# Patient Record
Sex: FEMALE | Race: BLACK OR AFRICAN AMERICAN | Employment: FULL TIME | URBAN - METROPOLITAN AREA
[De-identification: names, ages, dates, MRNs, and addresses within clinical notes are randomized per-mention and may not be internally consistent; named-entity substitution may affect disease eponyms.]

---

## 2018-03-23 ENCOUNTER — HOSPITAL ENCOUNTER (OUTPATIENT)
Dept: MAMMOGRAPHY | Age: 43
Discharge: HOME | End: 2018-03-23
Attending: INTERNAL MEDICINE
Payer: COMMERCIAL

## 2018-03-23 ENCOUNTER — HOSPITAL ENCOUNTER (OUTPATIENT)
Dept: ULTRASOUND IMAGING | Age: 43
Discharge: HOME | End: 2018-03-23
Attending: INTERNAL MEDICINE
Payer: COMMERCIAL

## 2018-03-23 DIAGNOSIS — Z80.3 FAMILY HISTORY OF BREAST CANCER: ICD-10-CM

## 2018-03-23 DIAGNOSIS — N63.0 LUMP OR MASS IN BREAST: ICD-10-CM

## 2018-03-23 PROCEDURE — 77062 BREAST TOMOSYNTHESIS BI: CPT

## 2018-03-23 PROCEDURE — 76642 ULTRASOUND BREAST LIMITED: CPT

## 2018-04-17 ENCOUNTER — OFFICE VISIT (OUTPATIENT)
Dept: SURGERY | Age: 43
End: 2018-04-17

## 2018-04-17 VITALS
OXYGEN SATURATION: 98 % | HEART RATE: 96 BPM | SYSTOLIC BLOOD PRESSURE: 128 MMHG | DIASTOLIC BLOOD PRESSURE: 80 MMHG | WEIGHT: 133.2 LBS | RESPIRATION RATE: 16 BRPM | BODY MASS INDEX: 21.41 KG/M2 | TEMPERATURE: 98.9 F | HEIGHT: 66 IN

## 2018-04-17 DIAGNOSIS — N63.10 BREAST MASS, RIGHT: Primary | ICD-10-CM

## 2018-04-17 NOTE — PROGRESS NOTES
Patient presents today for evaluation of right breast mass, First noticed at the beginning of February. No Discharge or pain.

## 2018-04-17 NOTE — MR AVS SNAPSHOT
[address] [facility] 
 
 
 [address] [facility] [address] [address] 
[phone] Patient: Isabella Ricardo MRN: [id number] Visit Information Date & Time Provider Department Dept. Phone Encounter #  
 4/17/2018  3:00 PM David De La Rosa MD [facility] [address] [facility] [id number] Your Appointments 5/17/2018  9:00 AM  
POST OP with David De La Rosa MD  
[address] [facility] Appt Note: Post op [address] [facility] [address] [place]  
  
   
 [address] [facility] [address] [facility] [address] Upcoming Health Maintenance Date Due DTaP/Tdap/Td series (1 - Tdap) 3/3/1994 PAP AKA CERVICAL CYTOLOGY 3/3/1994 Influenza Age 5 to Adult 8/1/2017 Allergies as of 4/17/2018  Review Complete On: 4/17/2018 By: David De La Rosa MD  
  
 Severity Noted Reaction Type Reactions Sulfa (Sulfonamide Antibiotics)  12/30/2014    Hives Current Immunizations  Never Reviewed No immunizations on file. Not reviewed this visit You Were Diagnosed With   
  
 Codes Comments Breast mass, right    -  Primary ICD-10-CM: N63.10 ICD-9-CM: 611.72 Vitals BP Pulse Temp Resp Height(growth percentile) Weight(growth percentile) 128/80 (BP 1 Location: Right arm, BP Patient Position: Sitting) 96 98.9 °F (37.2 °C) (Oral) 16 5' 6\" (1.676 m) 133 lb 3.2 oz (60.4 kg) SpO2 BMI OB Status Smoking Status 98% 21.5 kg/m2 Having regular periods Never Smoker Vitals History BMI and BSA Data Body Mass Index Body Surface Area  
 21.5 kg/m 2 1.68 m 2 Your Updated Medication List  
  
   
This list is accurate as of 4/17/18  4:16 PM.  Always use your most recent med list.  
  
  
  
  
 Carlos Eduarod Valverde (26) 0.1-20 mg-mcg Tab Generic drug:  levonorgestrel-ethinyl estradiol Take  by mouth. hydroCHLOROthiazide 12.5 mg tablet Commonly known as:  HYDRODIURIL Take 12.5 mg by mouth daily. We Performed the Following CBC WITH AUTOMATED DIFF [34031 CPT(R)] METABOLIC PANEL, COMPREHENSIVE [60013 CPT(R)] To-Do List   
 09/24/2018 9:00 AM  
  Appointment with [address] [place] [address] at [facility] ([phone]) OUTSIDE FILMS  - Any outside films related to the study being scheduled should be brought with you on the day of the exam.  If this cannot be done there may be a delay in the reading of the study. MEDICATIONS  - Patient must bring a complete list of all medications currently taking to include prescriptions, over-the-counter meds, herbals, vitamins & any dietary supplements Patient Instructions If you have any questions or concerns about today's appointment, the verbal and/or written instructions you were given for follow up care, please call our office at [phone]. [facility] - [address], [place] [phone] office [phone] fax Jose L Martínez PATIENT PRE AND POST OPERATIVE INSTRUCTIONS [address] 
[phone] Before Surgery Instructions:  
1) You must have someone available to drive you to and from your procedure and stay with you for the first 24 hours. 2) It is very important that you have nothing to eat or drink after midnight the night before your surgery. This includes chewing gum or sucking on hard candy. Take only heart, blood pressure and cholesterol medications the morning of surgery with only a sip of water. 3) Please stop taking Plavix 10 days prior to your surgery. Stop taking Coumadin 5 days prior to your surgery. Stop taking all Aspirin or Aspirin containing products 7 days prior to your surgery. Stop taking Advil, Motrin, Aleve, and etc. 3 days prior to your surgery. 4) If you take any diabetic medications please consult with your primary care physician on how to take them on the day of your surgery 5) Please stop all Herbal products 2 weeks prior to your surgery. 6) Please arrive at the hospital 2 hours prior to your surgery, unless you have been otherwise instructed. 7) Patients having an operation on their colon will be given a separate instruction sheet on their Bowel Prep. 8) For any pre-operative work up check in at the main entrance to [facility], and then go to Patient Registration. These studies are done on a walk in basis they are open from 7:00am to 5:00pm Monday through Friday. 9) Please wash your surgical site the morning of your surgery with soap and water. 10) If you are of child bearing age you will have pregnancy test done the morning of your surgery as soon as you arrive. 11) You may be contacted to change your surgery time. At times this is necessary due to equipment or staffing needs. After Surgery Instructions: You will need to be seen in the office for a follow-up visit 7-14 days after your surgery. Please call after you have had the procedure to make this appointment. Unless otherwise instructed, you may remove your outer bandage and shower 48 hours after your surgery. If you develop a fever greater than 101, have any significant drainage, bleeding, swelling and/or pus of the wound. Please call our office immediately. Surgery Date and Time:  Thursday, May 10, 2018 at 1:00pm 
 
Please check in at [facility], enter through the Emergency Room entrance and go up to the second floor. Please check in by 11:00am the day of your surgery. You may contact Sabrina Hair with any questions at [phone]. Introducing \[facility] [place] [facility]\"" & HEALTH SERVICES! [facility] [employer] introduces [employer] patient portal. Now you can access parts of your medical record, email your doctor's office, and request medication refills online. 1. In your internet browser, go to [email or web address] 2. Click on the First Time User? Click Here link in the Sign In box. You will see the New Member Sign Up page. 3. Enter your [employer] Access Code exactly as it appears below. You will not need to use this code after youve completed the sign-up process. If you do not sign up before the expiration date, you must request a new code. · [employer] Access Code: [email or web address] Expires: 6/14/2018  2:34 PM 
 
4. Enter the last four digits of your Social Security Number (xxxx) and Date of Birth (mm/dd/yyyy) as indicated and click Submit. You will be taken to the next sign-up page. 5. Create a Solexat ID. This will be your [employer] login ID and cannot be changed, so think of one that is secure and easy to remember. 6. Create a [employer] password. You can change your password at any time. 7. Enter your Password Reset Question and Answer. This can be used at a later time if you forget your password. 8. Enter your e-mail address. You will receive e-mail notification when new information is available in [address] 9. Click Sign Up. You can now view and download portions of your medical record. 10. Click the Download Summary menu link to download a portable copy of your medical information. If you have questions, please visit the Frequently Asked Questions section of the [employer] website. Remember, [employer] is NOT to be used for urgent needs. For medical emergencies, dial 911. Now available from your iPhone and Android! Please provide this summary of care documentation to your next provider. Your primary care clinician is listed as Jud Quezada. If you have any questions after today's visit, please call [phone].

## 2018-04-17 NOTE — PATIENT INSTRUCTIONS
If you have any questions or concerns about today's appointment, the verbal and/or written instructions you were given for follow up care, please call our office at [phone]. [facility] - [facility]  [address] [place] [address] Road    [phone] office  [phone] fax      . Henok Orr PATIENT PRE AND POST OPERATIVE INSTRUCTIONS     [facility] [address]  [phone]    Before Surgery Instructions:   1) You must have someone available to drive you to and from your procedure and stay with you for the first 24 hours. 2) It is very important that you have nothing to eat or drink after midnight the night before your surgery. This includes chewing gum or sucking on hard candy. Take only heart, blood pressure and cholesterol medications the morning of surgery with only a sip of water. 3) Please stop taking Plavix 10 days prior to your surgery. Stop taking Coumadin 5 days prior to your surgery. Stop taking all Aspirin or Aspirin containing products 7 days prior to your surgery. Stop taking Advil, Motrin, Aleve, and etc. 3 days prior to your surgery. 4) If you take any diabetic medications please consult with your primary care physician on how to take them on the day of your surgery  5) Please stop all Herbal products 2 weeks prior to your surgery. 6) Please arrive at the hospital 2 hours prior to your surgery, unless you have been otherwise instructed. 7) Patients having an operation on their colon will be given a separate instruction sheet on their Bowel Prep. 8) For any pre-operative work up check in at the main entrance to [facility], and then go to Patient Registration. These studies are done on a walk in basis they are open from 7:00am to 5:00pm Monday through Friday. 9) Please wash your surgical site the morning of your surgery with soap and water.   10) If you are of child bearing age you will have pregnancy test done the morning of your surgery as soon as you arrive. 11) You may be contacted to change your surgery time. At times this is necessary due to equipment or staffing needs. After Surgery Instructions: You will need to be seen in the office for a follow-up visit 7-14 days after your surgery. Please call after you have had the procedure to make this appointment. Unless otherwise instructed, you may remove your outer bandage and shower 48 hours after your surgery. If you develop a fever greater than 101, have any significant drainage, bleeding, swelling and/or pus of the wound. Please call our office immediately. Surgery Date and Time:  Thursday, May 10, 2018 at 1:00pm    Please check in at [facility], enter through the Emergency Room entrance and go up to the second floor. Please check in by 11:00am the day of your surgery. You may contact Marcos Christian with any questions at [phone].

## 2018-04-18 NOTE — PROGRESS NOTES
History of present illness    Arin Bashir's to the office for evaluation of a new right breast mass. The patient first felt this in the medial lower right breast about 6 weeks ago. Shortly thereafter she saw her primary care physician and underwent an ultrasound and mammogram.  This showed a near isoechoic small nodule measuring almost 2.1 cm in the right breast in the 5 o'clock position 7 cm from the nipple. The breasts were noted to be quite dense. A small solid nodule appeared benign and perhaps consistent with a fibroadenoma. The patient comes today for further evaluation and consideration of removal.    The patient has had a cyst aspirated from the breast in the past but no other history of any significant breast problems. Menarche occurred at the age of 13 she is  1 she has taken birth control pills for 27 years. Her grandmother was diagnosed with breast cancer in her early 76s. There is no family history of ovarian cancer. Allergies   Allergen Reactions    Sulfa (Sulfonamide Antibiotics) Hives     Past Medical History:   Diagnosis Date    Breast cyst Prior to     Benign cyst - aspirated    Breast lump 2018    LIQ of RT breast palpable    Hypertension      Past Surgical History:   Procedure Laterality Date    HX BREAST BIOPSY Right prior to     benign cyst.  Dr. Rosalee Au office.      HX CYST INCISION AND DRAINAGE Right prior to     apsiration of benign cyst    HX HERNIA REPAIR      HX WISDOM TEETH EXTRACTION       Social History     Social History    Marital status: SINGLE     Spouse name: N/A    Number of children: N/A    Years of education: N/A     Social History Main Topics    Smoking status: Never Smoker    Smokeless tobacco: Never Used    Alcohol use Yes      Comment: occasionally    Drug use: No    Sexual activity: Not Asked     Other Topics Concern    None     Social History Narrative     REVIEW OF SYSTEMS     Constitutional: No fever, weight loss, fatigue or recent chills. Skin:  No recent rashes, dermatitis or abnormal moles. HEENT:  No changes in vision, vertigo, epistaxis, dysphasia, or hoarseness. Cardiac:  No chest pain, palpitations, or edema. History of hypertension with recent change of medications to Norvasc rather than HCTZ     Respiratory: No chronic cough, shortness of breath, wheezing, hemoptysis, or history of sleep apnea. Breasts/GYN:  No history of recent breast lumps or nipple discharge. Mammograms are up to date. No GYN-type problems. See the history of present illness     Gastrointestinal:  No significant food intolerances, no recent vomiting, no chronic abdominal pain, no change in bowel habits, no melena. No history of GERD. Genitourinary:  No history of hematuria, dysuria, frequency, or stress urinary incontinence. No nocturia. Musculoskeletal: No weakness, joint pains, or arthritis. Endocrine:  No history of thyroid disease. No diabetes. Lymph/hemo:  No history of blood transfusions or easy bruising. No anemia. Neuro: No dizziness or headaches or fainting. PHYSICAL EXAM    Visit Vitals    /80 (BP 1 Location: Right arm, BP Patient Position: Sitting)    Pulse 96    Temp 98.9 °F (37.2 °C) (Oral)    Resp 16    Ht 5' 6\" (1.676 m)    Wt 60.4 kg (133 lb 3.2 oz)    SpO2 98%    BMI 21.5 kg/m2          Constitutional:  Well-developed, well-nourished, no acute distress. Head:  Head, eyes, ears, nose, throat within normal limits. Skin:  No suspicious moles or rashes. Neck:  No masses or adenopathy. The airway appears normal. Thyroid is not enlarged and there are no palpable thyroid nodules. Lungs:  Lungs are clear to auscultation and percussion. No respiratory distress. No chest wall tenderness. Heart:  Heart is regular with no extra heart sounds or murmur heard.      Breast Exam: The breasts are free of any discrete tenderness or masses except in the lower inner right breast.  At the 5 o'clock position 7 cm from the nipple she has a palpable nodular nontender very movable mass that measures about 2 cm in size. Clinically this would be consistent with a fibroadenoma the way it moves the skin and nipple areas appear normal. Both axillae are negative. Abdomen: The abdomen is soft and nontender without organomegaly or masses. Bowel sounds are active and of normal pitch. There is no abdominal distention. No hernias are evident. Extremities:  No tenderness of the extremities and no significant swelling. Psych:  Alert and oriented. Assessment: Small nodular solid mass inferomedial right breast possible fibroadenoma #2 family history in patient's grandmother of breast cancer #3 hypertension. Recommendations I discussed with the patient her options for treatment including observation versus ultrasound-guided needle biopsy versus removal of the mass. She agreed to allow us to look at the mass with the ultrasound to see if we can see it and biopsy it. Limited ultrasound visualization of the mass in about the 5 o'clock position 7 cm from the nipple in the right breast revealed a solid fairly smooth margined breast mass that was nearly isoechoic from the surrounding tissue difficult to make out on ultrasound. I then further discussed the options for treatment with the patient including the biopsy. But she has decided that she really wants the mass removed regardless of whether the biopsy turned out benign. Therefore we will proceed with doing an excisional biopsy in the operating room. The above was dictated with Margarita. There may be unrecognized errors.     Blas Gallego MD

## 2018-04-18 NOTE — COMMUNICATION BODY
Dear [place] Dominik came to the office today for evaluation of the movable subcutaneous inferomedial right breast mass. Thank you for allowing me to see her. She just recently discovered this. It has not been tender. It has not changed in size of the last 6 weeks. It was seen on mammogram and ultrasound and appears to be solid. On my examination is located at the 5 o'clock position of the right breast 7 cm from the nipple. It can be seen on ultrasound but is nearly isoechoic with the surrounding tissue. Clinically I believe this probably is a fibroadenoma. However rather than doing a biopsy I decided to proceed with excision of the mass because the patient states that even if the biopsy comes back benign she wants the lump out. We will schedule this as an outpatient.     Thank you for your kind referral,    With kindest regards,    Ravi Wisdom MD

## 2018-05-04 ENCOUNTER — HOSPITAL ENCOUNTER (OUTPATIENT)
Dept: LAB | Age: 43
Discharge: HOME | End: 2018-05-04

## 2018-05-04 LAB — SENTARA SPECIMEN COL,SENBCF: NORMAL

## 2018-05-04 PROCEDURE — 99001 SPECIMEN HANDLING PT-LAB: CPT | Performed by: SPECIALIST

## 2018-05-05 LAB
A-G RATIO,AGRAT: 2.1 RATIO (ref 1.1–2.6)
ALBUMIN SERPL-MCNC: 4.7 G/DL (ref 3.5–5)
ALP SERPL-CCNC: 55 U/L (ref 25–115)
ALT SERPL-CCNC: 9 U/L (ref 5–40)
ANION GAP SERPL CALC-SCNC: 16 MMOL/L
AST SERPL W P-5'-P-CCNC: 18 U/L (ref 10–37)
BILIRUB SERPL-MCNC: 0.4 MG/DL (ref 0.2–1.2)
BUN SERPL-MCNC: 9 MG/DL (ref 6–22)
CALCIUM SERPL-MCNC: 9.7 MG/DL (ref 8.4–10.5)
CHLORIDE SERPL-SCNC: 98 MMOL/L (ref 98–110)
CO2 SERPL-SCNC: 28 MMOL/L (ref 20–32)
CREAT SERPL-MCNC: 1.1 MG/DL (ref 0.5–1.2)
ERYTHROCYTE [DISTWIDTH] IN BLOOD BY AUTOMATED COUNT: 12.9 % (ref 10–15.5)
GFRAA, 66117: >60
GFRNA, 66118: 51.5
GLOBULIN,GLOB: 2.2 G/DL (ref 2–4)
GLUCOSE SERPL-MCNC: 86 MG/DL (ref 70–99)
HCT VFR BLD AUTO: 43.4 % (ref 35.1–48)
HGB BLD-MCNC: 14.5 G/DL (ref 11.7–16)
MCH RBC QN AUTO: 31 PG (ref 26–34)
MCHC RBC AUTO-ENTMCNC: 33 G/DL (ref 31–36)
MCV RBC AUTO: 93 FL (ref 80–95)
PLATELET # BLD AUTO: 295 K/UL (ref 140–440)
PMV BLD AUTO: 11.1 FL (ref 9–13)
POTASSIUM SERPL-SCNC: 3.9 MMOL/L (ref 3.5–5.5)
PROT SERPL-MCNC: 6.9 G/DL (ref 6.4–8.3)
RBC # BLD AUTO: 4.69 M/UL (ref 3.8–5.2)
SODIUM SERPL-SCNC: 142 MMOL/L (ref 133–145)
WBC # BLD AUTO: 3.4 K/UL (ref 4–11)

## 2018-05-09 ENCOUNTER — ANESTHESIA EVENT (OUTPATIENT)
Dept: SURGERY | Age: 43
End: 2018-05-09
Payer: COMMERCIAL

## 2018-05-10 ENCOUNTER — HOSPITAL ENCOUNTER (OUTPATIENT)
Age: 43
Setting detail: OUTPATIENT SURGERY
Discharge: HOME | End: 2018-05-10
Attending: SPECIALIST | Admitting: SPECIALIST
Payer: COMMERCIAL

## 2018-05-10 ENCOUNTER — ANESTHESIA (OUTPATIENT)
Dept: SURGERY | Age: 43
End: 2018-05-10
Payer: COMMERCIAL

## 2018-05-10 VITALS
SYSTOLIC BLOOD PRESSURE: 156 MMHG | BODY MASS INDEX: 21.63 KG/M2 | DIASTOLIC BLOOD PRESSURE: 94 MMHG | HEIGHT: 66 IN | WEIGHT: 134.56 LBS | OXYGEN SATURATION: 100 % | RESPIRATION RATE: 18 BRPM | HEART RATE: 79 BPM | TEMPERATURE: 99.1 F

## 2018-05-10 DIAGNOSIS — N63.10 BREAST MASS, RIGHT: Primary | ICD-10-CM

## 2018-05-10 LAB — HCG UR QL: NEGATIVE

## 2018-05-10 PROCEDURE — 76060000032 HC ANESTHESIA 0.5 TO 1 HR: Performed by: SPECIALIST

## 2018-05-10 PROCEDURE — 77030010938 HC CLP LIG TELE -A: Performed by: SPECIALIST

## 2018-05-10 PROCEDURE — 77030031139 HC SUT VCRL2 J&J -A: Performed by: SPECIALIST

## 2018-05-10 PROCEDURE — 77030002986 HC SUT PROL J&J -A: Performed by: SPECIALIST

## 2018-05-10 PROCEDURE — 74011000250 HC RX REV CODE- 250: Performed by: SPECIALIST

## 2018-05-10 PROCEDURE — 74011250636 HC RX REV CODE- 250/636: Performed by: NURSE ANESTHETIST, CERTIFIED REGISTERED

## 2018-05-10 PROCEDURE — 77030002916 HC SUT ETHLN J&J -A: Performed by: SPECIALIST

## 2018-05-10 PROCEDURE — 77030003028 HC SUT VCRL J&J -A: Performed by: SPECIALIST

## 2018-05-10 PROCEDURE — 88305 TISSUE EXAM BY PATHOLOGIST: CPT | Performed by: SPECIALIST

## 2018-05-10 PROCEDURE — 81025 URINE PREGNANCY TEST: CPT

## 2018-05-10 PROCEDURE — 77030010516 HC APPL HEMA CLP TELE -B: Performed by: SPECIALIST

## 2018-05-10 PROCEDURE — 77030008462 HC STPLR SKN PROX J&J -A: Performed by: SPECIALIST

## 2018-05-10 PROCEDURE — 74011250636 HC RX REV CODE- 250/636

## 2018-05-10 PROCEDURE — 76010000138 HC OR TIME 0.5 TO 1 HR: Performed by: SPECIALIST

## 2018-05-10 PROCEDURE — 76210000020 HC REC RM PH II FIRST 0.5 HR: Performed by: SPECIALIST

## 2018-05-10 PROCEDURE — 77030018836 HC SOL IRR NACL ICUM -A: Performed by: SPECIALIST

## 2018-05-10 PROCEDURE — 76210000016 HC OR PH I REC 1 TO 1.5 HR: Performed by: SPECIALIST

## 2018-05-10 PROCEDURE — 77030012510 HC MSK AIRWY LMA TELE -B: Performed by: ANESTHESIOLOGY

## 2018-05-10 PROCEDURE — 77030032490 HC SLV COMPR SCD KNE COVD -B: Performed by: SPECIALIST

## 2018-05-10 PROCEDURE — 74011000250 HC RX REV CODE- 250

## 2018-05-10 PROCEDURE — 74011250637 HC RX REV CODE- 250/637: Performed by: NURSE ANESTHETIST, CERTIFIED REGISTERED

## 2018-05-10 PROCEDURE — 77030011267 HC ELECTRD BLD COVD -A: Performed by: SPECIALIST

## 2018-05-10 RX ORDER — BUPIVACAINE HYDROCHLORIDE AND EPINEPHRINE 2.5; 5 MG/ML; UG/ML
INJECTION, SOLUTION EPIDURAL; INFILTRATION; INTRACAUDAL; PERINEURAL AS NEEDED
Status: DISCONTINUED | OUTPATIENT
Start: 2018-05-10 | End: 2018-05-10 | Stop reason: HOSPADM

## 2018-05-10 RX ORDER — ACETAMINOPHEN AND CODEINE PHOSPHATE 300; 30 MG/1; MG/1
1-2 TABLET ORAL
Qty: 15 TAB | Refills: 0 | Status: SHIPPED | OUTPATIENT
Start: 2018-05-10 | End: 2018-05-15

## 2018-05-10 RX ORDER — ONDANSETRON 2 MG/ML
INJECTION INTRAMUSCULAR; INTRAVENOUS AS NEEDED
Status: DISCONTINUED | OUTPATIENT
Start: 2018-05-10 | End: 2018-05-10 | Stop reason: HOSPADM

## 2018-05-10 RX ORDER — MORPHINE SULFATE 4 MG/ML
4 INJECTION, SOLUTION INTRAMUSCULAR; INTRAVENOUS
Status: DISCONTINUED | OUTPATIENT
Start: 2018-05-10 | End: 2018-05-10 | Stop reason: HOSPADM

## 2018-05-10 RX ORDER — INSULIN LISPRO 100 [IU]/ML
INJECTION, SOLUTION INTRAVENOUS; SUBCUTANEOUS ONCE
Status: DISCONTINUED | OUTPATIENT
Start: 2018-05-11 | End: 2018-05-10 | Stop reason: HOSPADM

## 2018-05-10 RX ORDER — FENTANYL CITRATE 50 UG/ML
INJECTION, SOLUTION INTRAMUSCULAR; INTRAVENOUS AS NEEDED
Status: DISCONTINUED | OUTPATIENT
Start: 2018-05-10 | End: 2018-05-10 | Stop reason: HOSPADM

## 2018-05-10 RX ORDER — FENTANYL CITRATE 50 UG/ML
50 INJECTION, SOLUTION INTRAMUSCULAR; INTRAVENOUS AS NEEDED
Status: DISCONTINUED | OUTPATIENT
Start: 2018-05-10 | End: 2018-05-10 | Stop reason: HOSPADM

## 2018-05-10 RX ORDER — ONDANSETRON 2 MG/ML
4 INJECTION INTRAMUSCULAR; INTRAVENOUS ONCE
Status: DISCONTINUED | OUTPATIENT
Start: 2018-05-10 | End: 2018-05-10 | Stop reason: HOSPADM

## 2018-05-10 RX ORDER — FAMOTIDINE 20 MG/1
20 TABLET, FILM COATED ORAL ONCE
Status: COMPLETED | OUTPATIENT
Start: 2018-05-11 | End: 2018-05-10

## 2018-05-10 RX ORDER — SODIUM CHLORIDE 0.9 % (FLUSH) 0.9 %
5-10 SYRINGE (ML) INJECTION AS NEEDED
Status: DISCONTINUED | OUTPATIENT
Start: 2018-05-10 | End: 2018-05-10 | Stop reason: HOSPADM

## 2018-05-10 RX ORDER — SODIUM CHLORIDE, SODIUM LACTATE, POTASSIUM CHLORIDE, CALCIUM CHLORIDE 600; 310; 30; 20 MG/100ML; MG/100ML; MG/100ML; MG/100ML
50 INJECTION, SOLUTION INTRAVENOUS CONTINUOUS
Status: DISCONTINUED | OUTPATIENT
Start: 2018-05-10 | End: 2018-05-10 | Stop reason: HOSPADM

## 2018-05-10 RX ORDER — DEXAMETHASONE SODIUM PHOSPHATE 4 MG/ML
INJECTION, SOLUTION INTRA-ARTICULAR; INTRALESIONAL; INTRAMUSCULAR; INTRAVENOUS; SOFT TISSUE AS NEEDED
Status: DISCONTINUED | OUTPATIENT
Start: 2018-05-10 | End: 2018-05-10 | Stop reason: HOSPADM

## 2018-05-10 RX ORDER — PROPOFOL 10 MG/ML
INJECTION, EMULSION INTRAVENOUS AS NEEDED
Status: DISCONTINUED | OUTPATIENT
Start: 2018-05-10 | End: 2018-05-10 | Stop reason: HOSPADM

## 2018-05-10 RX ORDER — KETOROLAC TROMETHAMINE 30 MG/ML
INJECTION, SOLUTION INTRAMUSCULAR; INTRAVENOUS AS NEEDED
Status: DISCONTINUED | OUTPATIENT
Start: 2018-05-10 | End: 2018-05-10 | Stop reason: HOSPADM

## 2018-05-10 RX ORDER — FAMOTIDINE 20 MG/1
TABLET, FILM COATED ORAL
Status: DISCONTINUED
Start: 2018-05-10 | End: 2018-05-10 | Stop reason: HOSPADM

## 2018-05-10 RX ORDER — SODIUM CHLORIDE 0.9 % (FLUSH) 0.9 %
5-10 SYRINGE (ML) INJECTION EVERY 8 HOURS
Status: DISCONTINUED | OUTPATIENT
Start: 2018-05-10 | End: 2018-05-10 | Stop reason: HOSPADM

## 2018-05-10 RX ORDER — LIDOCAINE HYDROCHLORIDE 20 MG/ML
INJECTION, SOLUTION EPIDURAL; INFILTRATION; INTRACAUDAL; PERINEURAL AS NEEDED
Status: DISCONTINUED | OUTPATIENT
Start: 2018-05-10 | End: 2018-05-10 | Stop reason: HOSPADM

## 2018-05-10 RX ORDER — MIDAZOLAM HYDROCHLORIDE 1 MG/ML
INJECTION, SOLUTION INTRAMUSCULAR; INTRAVENOUS AS NEEDED
Status: DISCONTINUED | OUTPATIENT
Start: 2018-05-10 | End: 2018-05-10 | Stop reason: HOSPADM

## 2018-05-10 RX ORDER — SODIUM CHLORIDE, SODIUM LACTATE, POTASSIUM CHLORIDE, CALCIUM CHLORIDE 600; 310; 30; 20 MG/100ML; MG/100ML; MG/100ML; MG/100ML
50 INJECTION, SOLUTION INTRAVENOUS CONTINUOUS
Status: DISCONTINUED | OUTPATIENT
Start: 2018-05-11 | End: 2018-05-10 | Stop reason: HOSPADM

## 2018-05-10 RX ADMIN — ONDANSETRON 4 MG: 2 INJECTION INTRAMUSCULAR; INTRAVENOUS at 13:46

## 2018-05-10 RX ADMIN — DEXAMETHASONE SODIUM PHOSPHATE 4 MG: 4 INJECTION, SOLUTION INTRA-ARTICULAR; INTRALESIONAL; INTRAMUSCULAR; INTRAVENOUS; SOFT TISSUE at 13:26

## 2018-05-10 RX ADMIN — SODIUM CHLORIDE, SODIUM LACTATE, POTASSIUM CHLORIDE, AND CALCIUM CHLORIDE 50 ML/HR: 600; 310; 30; 20 INJECTION, SOLUTION INTRAVENOUS at 11:35

## 2018-05-10 RX ADMIN — FAMOTIDINE 20 MG: 20 TABLET ORAL at 11:35

## 2018-05-10 RX ADMIN — LIDOCAINE HYDROCHLORIDE 60 MG: 20 INJECTION, SOLUTION EPIDURAL; INFILTRATION; INTRACAUDAL; PERINEURAL at 13:20

## 2018-05-10 RX ADMIN — FENTANYL CITRATE 50 MCG: 50 INJECTION, SOLUTION INTRAMUSCULAR; INTRAVENOUS at 13:24

## 2018-05-10 RX ADMIN — MIDAZOLAM HYDROCHLORIDE 2 MG: 1 INJECTION, SOLUTION INTRAMUSCULAR; INTRAVENOUS at 13:16

## 2018-05-10 RX ADMIN — KETOROLAC TROMETHAMINE 30 MG: 30 INJECTION, SOLUTION INTRAMUSCULAR; INTRAVENOUS at 13:48

## 2018-05-10 RX ADMIN — FENTANYL CITRATE 50 MCG: 50 INJECTION, SOLUTION INTRAMUSCULAR; INTRAVENOUS at 13:30

## 2018-05-10 RX ADMIN — PROPOFOL 200 MG: 10 INJECTION, EMULSION INTRAVENOUS at 13:20

## 2018-05-10 NOTE — DISCHARGE INSTRUCTIONS
[facility]  [id number] [place] [address]  [phone]    Dr. Na Miranda' Discharge Instructions    Patient: Donte Saldaña MRN: [id number]  SSN: [id number]    YOB: 1975  Age: 37 y.o. Sex: female       General Instructions    1. No heavy lifting, straining or exercise until notified by the doctor. 2. Do not drive while you are still taking narcotic pain medications. 3. Ok to resume regular diet. 4. Alright to shower and wet wounds after 48 hours, but make sure to pat dry. Do not sit in bath or swim until approved by the doctor. 5. Please call [phone] to schedule follow-up appointment upon discharge. Follow-up should be in one week to ten days. Our office is located at: [address] [place] [address]. 6. Please fill prescriptions on the way home from the hospital, therefore any unforeseen problems can be dealt with during regular office hours. What to Expect    1. There will be some mild discomfort. Take the pain medication as necessary and don't try to be a hero. 2. There may be some clear to bloody discharge from the wounds. A small amount is normal.  There may be bruising around the incisions. 3. You might have a very small amount of dark stool or blood in the stool. 4. You might experience constipation due to the pain meds. If this occurs, please obtain a laxative from the pharmacy and take as instructed. A good choice is Milk of Magnesia. Also take a daily stool softener to prevent problems. When to call the office (What not to expect)    1. A fever of 101.5 or higher  2. Significant abdominal pain or vomiting  3. Inability to eat or drink  4. Pus from the wounds  5. Rash around the wounds  6. Pain or swelling of the legs     When to seek emergency care    1. Significant chest pain or trouble breathing  2. Lightheadedness, dizziness or passing out  3.  Significant blood in vomit or stool  4. Severe abdominal pain that will not go away  5. Very low or very high blood pressure. DISCHARGE SUMMARY from Nurse    PATIENT INSTRUCTIONS:    After general anesthesia or intravenous sedation, for 24 hours or while taking prescription Narcotics:  · Limit your activities  · Do not drive and operate hazardous machinery  · Do not make important personal or business decisions  · Do  not drink alcoholic beverages  · If you have not urinated within 8 hours after discharge, please contact your surgeon on call. Report the following to your surgeon:  · Excessive pain, swelling, redness or odor of or around the surgical area  · Temperature over 100.5  · Nausea and vomiting lasting longer than 4 hours or if unable to take medications  · Any signs of decreased circulation or nerve impairment to extremity: change in color, persistent  numbness, tingling, coldness or increase pain  · Any questions    What to do at Home:  Recommended activity: Activity as tolerated and no driving for today    These are general instructions for a healthy lifestyle:    No smoking/ No tobacco products/ Avoid exposure to second hand smoke  Surgeon General's Warning:  Quitting smoking now greatly reduces serious risk to your health. Obesity, smoking, and sedentary lifestyle greatly increases your risk for illness    A healthy diet, regular physical exercise & weight monitoring are important for maintaining a healthy lifestyle    You may be retaining fluid if you have a history of heart failure or if you experience any of the following symptoms:  Weight gain of 3 pounds or more overnight or 5 pounds in a week, increased swelling in our hands or feet or shortness of breath while lying flat in bed. Please call your doctor as soon as you notice any of these symptoms; do not wait until your next office visit.     Recognize signs and symptoms of STROKE:    F-face looks uneven    A-arms unable to move or move unevenly    S-speech slurred or non-existent    T-time-call 911 as soon as signs and symptoms begin-DO NOT go       Back to bed or wait to see if you get better-TIME IS BRAIN. Warning Signs of HEART ATTACK     Call 911 if you have these symptoms:   Chest discomfort. Most heart attacks involve discomfort in the center of the chest that lasts more than a few minutes, or that goes away and comes back. It can feel like uncomfortable pressure, squeezing, fullness, or pain.  Discomfort in other areas of the upper body. Symptoms can include pain or discomfort in one or both arms, the back, neck, jaw, or stomach.  Shortness of breath with or without chest discomfort.  Other signs may include breaking out in a cold sweat, nausea, or lightheadedness. Don't wait more than five minutes to call 911 - MINUTES MATTER! Fast action can save your life. Calling 911 is almost always the fastest way to get lifesaving treatment. Emergency Medical Services staff can begin treatment when they arrive -- up to an hour sooner than if someone gets to the hospital by car. The discharge information has been reviewed with the patient. The patient verbalized understanding. Discharge medications reviewed with the patient and appropriate educational materials and side effects teaching were provided. Patient armband removed and given to patient to take home. Patient was informed of the privacy risks if armband lost or stolen.

## 2018-05-10 NOTE — INTERVAL H&P NOTE
H&P Update:  Jordan Dupont was seen and examined. History and physical has been reviewed. The patient has been examined.  There have been no significant clinical changes since the completion of the originally dated History and Physical.    Signed By: David Simons MD     May 10, 2018 1:03 PM

## 2018-05-10 NOTE — H&P (VIEW-ONLY)
History of present illness    Arin Bashir's to the office for evaluation of a new right breast mass. The patient first felt this in the medial lower right breast about 6 weeks ago. Shortly thereafter she saw her primary care physician and underwent an ultrasound and mammogram.  This showed a near isoechoic small nodule measuring almost 2.1 cm in the right breast in the 5 o'clock position 7 cm from the nipple. The breasts were noted to be quite dense. A small solid nodule appeared benign and perhaps consistent with a fibroadenoma. The patient comes today for further evaluation and consideration of removal.    The patient has had a cyst aspirated from the breast in the past but no other history of any significant breast problems. Menarche occurred at the age of 13 she is  1 she has taken birth control pills for 27 years. Her grandmother was diagnosed with breast cancer in her early 76s. There is no family history of ovarian cancer. Allergies   Allergen Reactions    Sulfa (Sulfonamide Antibiotics) Hives     Past Medical History:   Diagnosis Date    Breast cyst Prior to     Benign cyst - aspirated    Breast lump 2018    LIQ of RT breast palpable    Hypertension      Past Surgical History:   Procedure Laterality Date    HX BREAST BIOPSY Right prior to     benign cyst.  Dr. Laurent House office.      HX CYST INCISION AND DRAINAGE Right prior to     apsiration of benign cyst    HX HERNIA REPAIR      HX WISDOM TEETH EXTRACTION       Social History     Social History    Marital status: SINGLE     Spouse name: N/A    Number of children: N/A    Years of education: N/A     Social History Main Topics    Smoking status: Never Smoker    Smokeless tobacco: Never Used    Alcohol use Yes      Comment: occasionally    Drug use: No    Sexual activity: Not Asked     Other Topics Concern    None     Social History Narrative     REVIEW OF SYSTEMS     Constitutional: No fever, weight loss, fatigue or recent chills. Skin:  No recent rashes, dermatitis or abnormal moles. HEENT:  No changes in vision, vertigo, epistaxis, dysphasia, or hoarseness. Cardiac:  No chest pain, palpitations, or edema. History of hypertension with recent change of medications to Norvasc rather than HCTZ     Respiratory: No chronic cough, shortness of breath, wheezing, hemoptysis, or history of sleep apnea. Breasts/GYN:  No history of recent breast lumps or nipple discharge. Mammograms are up to date. No GYN-type problems. See the history of present illness     Gastrointestinal:  No significant food intolerances, no recent vomiting, no chronic abdominal pain, no change in bowel habits, no melena. No history of GERD. Genitourinary:  No history of hematuria, dysuria, frequency, or stress urinary incontinence. No nocturia. Musculoskeletal: No weakness, joint pains, or arthritis. Endocrine:  No history of thyroid disease. No diabetes. Lymph/hemo:  No history of blood transfusions or easy bruising. No anemia. Neuro: No dizziness or headaches or fainting. PHYSICAL EXAM    Visit Vitals    /80 (BP 1 Location: Right arm, BP Patient Position: Sitting)    Pulse 96    Temp 98.9 °F (37.2 °C) (Oral)    Resp 16    Ht 5' 6\" (1.676 m)    Wt 60.4 kg (133 lb 3.2 oz)    SpO2 98%    BMI 21.5 kg/m2          Constitutional:  Well-developed, well-nourished, no acute distress. Head:  Head, eyes, ears, nose, throat within normal limits. Skin:  No suspicious moles or rashes. Neck:  No masses or adenopathy. The airway appears normal. Thyroid is not enlarged and there are no palpable thyroid nodules. Lungs:  Lungs are clear to auscultation and percussion. No respiratory distress. No chest wall tenderness. Heart:  Heart is regular with no extra heart sounds or murmur heard.      Breast Exam: The breasts are free of any discrete tenderness or masses except in the lower inner right breast.  At the 5 o'clock position 7 cm from the nipple she has a palpable nodular nontender very movable mass that measures about 2 cm in size. Clinically this would be consistent with a fibroadenoma the way it moves the skin and nipple areas appear normal. Both axillae are negative. Abdomen: The abdomen is soft and nontender without organomegaly or masses. Bowel sounds are active and of normal pitch. There is no abdominal distention. No hernias are evident. Extremities:  No tenderness of the extremities and no significant swelling. Psych:  Alert and oriented. Assessment: Small nodular solid mass inferomedial right breast possible fibroadenoma #2 family history in patient's grandmother of breast cancer #3 hypertension. Recommendations I discussed with the patient her options for treatment including observation versus ultrasound-guided needle biopsy versus removal of the mass. She agreed to allow us to look at the mass with the ultrasound to see if we can see it and biopsy it. Limited ultrasound visualization of the mass in about the 5 o'clock position 7 cm from the nipple in the right breast revealed a solid fairly smooth margined breast mass that was nearly isoechoic from the surrounding tissue difficult to make out on ultrasound. I then further discussed the options for treatment with the patient including the biopsy. But she has decided that she really wants the mass removed regardless of whether the biopsy turned out benign. Therefore we will proceed with doing an excisional biopsy in the operating room. The above was dictated with Margarita. There may be unrecognized errors.     Kristy Preston MD

## 2018-05-10 NOTE — PERIOP NOTES
[facility] care of patient via stretcher. Patient placed on monitor x3. VSS. Patient un awakened from anesthesia at this moment. 1404  Patient responsive to verbal stimuli. Denies any nausea or pain at this time. 1418  Patient mother Berna Recio) updated.

## 2018-05-10 NOTE — PERIOP NOTES
Pt had an episode of vomiting. Some bright red blood appeared around the insertion site of the catheter. No active bleeding. Per Dr. Shane Booth pt can treated elevated blood sugar at home.

## 2018-05-10 NOTE — BRIEF OP NOTE
BRIEF OPERATIVE NOTE    Date of Procedure: 5/10/2018   Preoperative Diagnosis: right breast mass  N63.10  5:00-7cm  Postoperative Diagnosis: right breast mass  n63.10    Procedure(s):  excision right breast mass  Surgeon(s) and Role:     * Addison Salas MD - Primary         Surgical Assistant: Andrea Lilly    Surgical Staff:  Circ-2: Ramona Baez RN  Surg Asst-1: Amanda Nunes  Event Time In   Incision Start 1330   Incision Close 1350     Anesthesia: General LMA and 1/4%marcaine+epi-5ml  Estimated Blood Loss: 5ml  Specimens:   ID Type Source Tests Collected by Time Destination   1 : RIGHT BREAST MASS 5 O'CLOCK, 7cm FROM NIPPLE Preservative Breast Mass,Right  Addison Salas MD 5/10/2018 1343 Pathology      Findings: dense breast tissue   Complications: none  Implants: * No implants in log *

## 2018-05-10 NOTE — OP NOTES
[facility]  OPERATIVE REPORT    Babs Jones  MR#: [id number]  : 1975  ACCOUNT #: [de-identified]   DATE OF SERVICE: 05/10/2018    PREOPERATIVE DIAGNOSIS:  Movable, slightly tender mass, right breast, 5 o'clock position, 7 cm from nipple, possible fibroadenoma. POSTOPERATIVE DIAGNOSIS:  Movable, slightly tender mass, right breast, 5 o'clock position, 7 cm from nipple, possible fibroadenoma. Await final path report. OPERATIVE PROCEDURE:  Excisional right breast biopsy. SURGEON:  Judy Perez MD    ASSISTANT:  Trever    ANESTHESIA:  General LMA plus 0.25% Marcaine with epinephrine 5 mL. ESTIMATED BLOOD LOSS:  5 mL. SPECIMENS REMOVED:  Lumpy area, 5 o'clock position, right breast.    IMPLANTS:  None. COMPLICATIONS:  None. OPERATIVE FINDINGS:  The patient is a 75-year-old female who felt a lump in the medial inferior portion of her right breast and was concerned about this. It appeared to be benign on ultrasound study. Clinically, it felt like a fibroadenoma. Regardless, the patient will have this removed, so we have scheduled just excision in the operating room. OPERATIVE PROCEDURE:  The patient had the area marked before surgery. She was taken to the operating room and placed under general anesthesia. SCDs were in place. The right breast was prepped and draped in the appropriate manner and a timeout was performed. Then, we infiltrated skin and subcutaneous tissue with the 0.25% Marcaine with epi. I made a small transverse incision, continued down to subcutaneous tissue, and then removed the area of the nodularity. Once we opened the skin, this area moved readily which is clinically what made us think it was a fibroadenoma, but as we excised the area, it looked more like maybe just fibrous tissue and fibrocystic type changes, rather than a discrete fibroadenoma, but pathology report is pending. There was good hemostasis.   I irrigated the wound.    Then, I closed the deeper tissue with interrupted 2-0 Vicryl. The subdermal tissue with interrupted 3-0 Vicryl and then I closed the skin with a subcuticular 4-0 Prolene and Steri-Strips applied, sterile dressings and patient taken to recovery in satisfactory condition.       MD SONIDO Driscoll / [place]  D: 05/10/2018 13:55     T: 05/10/2018 14:29  JOB #: [id number]

## 2018-05-10 NOTE — ANESTHESIA PREPROCEDURE EVALUATION
Anesthetic History   No history of anesthetic complications            Review of Systems / Medical History  Patient summary reviewed and pertinent labs reviewed    Pulmonary  Within defined limits                 Neuro/Psych   Within defined limits           Cardiovascular    Hypertension: well controlled                   GI/Hepatic/Renal  Within defined limits              Endo/Other  Within defined limits           Other Findings   Comments: Documentation of current medication  Current medications obtained, documented and obtained? YES      Risk Factors for Postoperative nausea/vomiting:       History of postoperative nausea/vomiting? NO       Female? YES       Motion sickness? NO       Intended opioid administration for postoperative analgesia? YES      Smoking Abstinence:  Current Smoker? NO  Elective Surgery? YES  Seen preoperatively by anesthesiologist or proxy prior to day of surgery? YES  Pt abstained from smoking 24 hours prior to anesthesia?  N/A    Preventive care/screening for High Blood Pressure:  Aged 18 years and older: YES  Screened for high blood pressure: YES  Patients with high blood pressure referred to primary care provider   for BP management: YES                     Physical Exam    Airway  Mallampati: II  TM Distance: 4 - 6 cm  Neck ROM: normal range of motion   Mouth opening: Normal     Cardiovascular               Dental  No notable dental hx       Pulmonary                 Abdominal  GI exam deferred       Other Findings            Anesthetic Plan    ASA: 2  Anesthesia type: general          Induction: Intravenous  Anesthetic plan and risks discussed with: Patient

## 2018-05-10 NOTE — IP AVS SNAPSHOT
[place] 
 
 
 [address] 
[phone] Patient: Kristy Reyna MRN: [id number] RKU:9/2/4777 About your hospitalization You were admitted on:  May 10, 2018 You last received care in the:  [facility] PHASE 2 RECOVERY You were discharged on:  May 10, 2018 Why you were hospitalized Your primary diagnosis was:  Not on File Follow-up Information Follow up With Details Comments Contact Info Austin Cuba MD   [address] [place] [address] 
[phone] Your Scheduled Appointments Tuesday May 22, 2018  1:45 PM EDT  
POST OP with Alexi Coy MD  
[address] [facility] [address] [place] [address] [phone]  
[phone] Discharge Orders Procedure Order Date Status Priority Quantity Spec Type Associated Dx CALL YOUR DOCTOR For: Persistant nausea and vomiting., Redness, tenderness, or signs of infection. , Severe uncontrolled pain. Call for temp greater than 101 05/10/18 1402 Normal Routine 1  Breast mass, right [[id number]] Comments:  Call for temp greater than 101 Questions: For:  Persistant nausea and vomiting. For:  Redness, tenderness, or signs of infection. For:  Severe uncontrolled pain. ACTIVITY AFTER DISCHARGE Patient should: Restrict lifting, Shower on day of dressing removal(no baths). 05/10/18 1402 Normal Routine 1  Breast mass, right [[id number]] Questions: Patient should:  Restrict lifting Patient should: Shower on day of dressing removal(no baths). DIET REGULAR 05/10/18 1402 Normal Routine 1  Breast mass, right [[id number]] DRESSING, REMOVE IN 48 HOURS 05/10/18 1402 Normal Routine 1  Breast mass, right [[id number]] Comments:  Remove dressing in 48 hours. Leave steri strips if present. A check grabiel indicates which time of day the medication should be taken. My Medications START taking these medications Instructions Each Dose to Equal  
 Morning Noon Evening Bedtime  
 acetaminophen-codeine 300-30 mg per tablet Commonly known as:  TYLENOL-CODEINE #3 Your last dose was: Your next dose is: Take 1-2 Tabs by mouth every four (4) hours as needed for Pain for up to 5 days. Max Daily Amount: 12 Tabs. 1-2 Tab CONTINUE taking these medications Instructions Each Dose to Equal  
 Morning Noon Evening Bedtime DELYLA (28) 0.1-20 mg-mcg Tab Generic drug:  levonorgestrel-ethinyl estradiol Your last dose was: Your next dose is: Take  by mouth. NORVASC PO Your last dose was: Your next dose is: Take 5 mg by mouth daily. 5 mg Where to Get Your Medications Information on where to get these meds will be given to you by the nurse or doctor. ! Ask your nurse or doctor about these medications  
  acetaminophen-codeine 300-30 mg per tablet Opioid Education Prescription Opioids: What You Need to Know: 
 
Prescription opioids can be used to help relieve moderate-to-severe pain and are often prescribed following a surgery or injury, or for certain health conditions. These medications can be an important part of treatment but also come with serious risks. Opioids are strong pain medicines. Examples include hydrocodone, oxycodone, fentanyl, and morphine. Heroin is an example of an illegal opioid. It is important to work with your health care provider to make sure you are getting the safest, most effective care. WHAT ARE THE RISKS AND SIDE EFFECTS OF OPIOID USE? Prescription opioids carry serious risks of addiction and overdose, especially with prolonged use. An opioid overdose, often marked by slow breathing, can cause sudden death.   The use of prescription opioids can have a number of side effects as well, even when taken as directed. · Tolerance-meaning you might need to take more of a medication for the same pain relief · Physical dependence-meaning you have symptoms of withdrawal when the medication is stopped. Withdrawal symptoms can include nausea, sweating, chills, diarrhea, stomach cramps, and muscle aches. Withdrawal can last up to several weeks, depending on which drug you took and how long you took it. · Increased sensitivity to pain · Constipation · Nausea, vomiting, and dry mouth · Sleepiness and dizziness · Confusion · Depression · Low levels of testosterone that can result in lower sex drive, energy, and strength · Itching and sweating RISKS ARE GREATER WITH:      
· History of drug misuse, substance use disorder, or overdose · Mental health conditions (such as depression or anxiety) · Sleep apnea · Older age (72 years or older) · Pregnancy Avoid alcohol while taking prescription opioids. Also, unless specifically advised by your health care provider, medications to avoid include: · Benzodiazepines (such as Xanax or Valium) · Muscle relaxants (such as Soma or Flexeril) · Hypnotics (such as Ambien or Lunesta) · Other prescription opioids KNOW YOUR OPTIONS Talk to your health care provider about ways to manage your pain that don't involve prescription opioids. Some of these options may actually work better and have fewer risks and side effects. Options may include: 
· Pain relievers such as acetaminophen, ibuprofen, and naproxen · Some medications that are also used for depression or seizures · Physical therapy and exercise · Counseling to help patients learn how to cope better with triggers of pain and stress. · Application of heat or cold compress · Massage therapy · Relaxation techniques Be Informed Make sure you know the name of your medication, how much and how often to take it, and its potential risks & side effects. IF YOU ARE PRESCRIBED OPIOIDS FOR PAIN: 
· Never take opioids in greater amounts or more often than prescribed. Remember the goal is not to be pain-free but to manage your pain at a tolerable level. · Follow up with your primary care provider to: · Work together to create a plan on how to manage your pain. · Talk about ways to help manage your pain that don't involve prescription opioids. · Talk about any and all concerns and side effects. · Help prevent misuse and abuse. · Never sell or share prescription opioids · Help prevent misuse and abuse. · Store prescription opioids in a secure place and out of reach of others (this may include visitors, children, friends, and family). · Safely dispose of unused/unwanted prescription opioids: Find your community drug take-back program or your pharmacy mail-back program, or flush them down the toilet, following guidance from the Food and Drug Administration (www.fda.gov/Drugs/ResourcesForYou). · Visit www.cdc.gov/drugoverdose to learn about the risks of opioid abuse and overdose. · If you believe you may be struggling with addiction, tell your health care provider and ask for guidance or call [address] [place] [address] at 8-123-431-[facility]. Discharge Instructions [facility] [address] [facility], [address] [facility] 
[phone] Dr. Grupo Mckay' Discharge Instructions Patient: Francisco Javier Vera MRN: [id number]  SSN: [id number] YOB: 1975  Age: 37 y.o. Sex: female General Instructions 1. No heavy lifting, straining or exercise until notified by the doctor. 2. Do not drive while you are still taking narcotic pain medications. 3. Ok to resume regular diet.  
4. Alright to shower and wet wounds after 48 hours, but make sure to pat dry. Do not sit in bath or swim until approved by the doctor. 5. Please call [phone] to schedule follow-up appointment upon discharge. Follow-up should be in one week to ten days. Our office is located at: [address] [place] [address], [facility]. 6. Please fill prescriptions on the way home from the hospital, therefore any unforeseen problems can be dealt with during regular office hours. What to Expect 1. There will be some mild discomfort. Take the pain medication as necessary and don't try to be a hero. 2. There may be some clear to bloody discharge from the wounds. A small amount is normal.  There may be bruising around the incisions. 3. You might have a very small amount of dark stool or blood in the stool. 4. You might experience constipation due to the pain meds. If this occurs, please obtain a laxative from the pharmacy and take as instructed. A good choice is Milk of Magnesia. Also take a daily stool softener to prevent problems. When to call the office (What not to expect) 1. A fever of 101.5 or higher 2. Significant abdominal pain or vomiting 3. Inability to eat or drink 4. Pus from the wounds 5. Rash around the wounds 6. Pain or swelling of the legs When to seek emergency care 1. Significant chest pain or trouble breathing 2. Lightheadedness, dizziness or passing out 3. Significant blood in vomit or stool 4. Severe abdominal pain that will not go away 5. Very low or very high blood pressure. DISCHARGE SUMMARY from Nurse PATIENT INSTRUCTIONS: 
 
After general anesthesia or intravenous sedation, for 24 hours or while taking prescription Narcotics: · Limit your activities · Do not drive and operate hazardous machinery · Do not make important personal or business decisions · Do  not drink alcoholic beverages · If you have not urinated within 8 hours after discharge, please contact your surgeon on call. Report the following to your surgeon: 
· Excessive pain, swelling, redness or odor of or around the surgical area · Temperature over 100.5 · Nausea and vomiting lasting longer than 4 hours or if unable to take medications · Any signs of decreased circulation or nerve impairment to extremity: change in color, persistent  numbness, tingling, coldness or increase pain · Any questions What to do at Home: 
Recommended activity: Activity as tolerated and no driving for today These are general instructions for a healthy lifestyle: No smoking/ No tobacco products/ Avoid exposure to second hand smoke Surgeon General's Warning:  Quitting smoking now greatly reduces serious risk to your health. Obesity, smoking, and sedentary lifestyle greatly increases your risk for illness A healthy diet, regular physical exercise & weight monitoring are important for maintaining a healthy lifestyle You may be retaining fluid if you have a history of heart failure or if you experience any of the following symptoms:  Weight gain of 3 pounds or more overnight or 5 pounds in a week, increased swelling in our hands or feet or shortness of breath while lying flat in bed. Please call your doctor as soon as you notice any of these symptoms; do not wait until your next office visit. Recognize signs and symptoms of STROKE: 
 
F-face looks uneven A-arms unable to move or move unevenly S-speech slurred or non-existent T-time-call 911 as soon as signs and symptoms begin-DO NOT go Back to bed or wait to see if you get better-TIME IS BRAIN. Warning Signs of HEART ATTACK Call 911 if you have these symptoms: 
? Chest discomfort. Most heart attacks involve discomfort in the center of the chest that lasts more than a few minutes, or that goes away and comes back. It can feel like uncomfortable pressure, squeezing, fullness, or pain. ? Discomfort in other areas of the upper body.  Symptoms can include pain or discomfort in one or both arms, the back, neck, jaw, or stomach. ? Shortness of breath with or without chest discomfort. ? Other signs may include breaking out in a cold sweat, nausea, or lightheadedness. Don't wait more than five minutes to call [address]! Fast action can save your life. Calling 911 is almost always the fastest way to get lifesaving treatment. Emergency Medical Services staff can begin treatment when they arrive  up to an hour sooner than if someone gets to the hospital by car. The discharge information has been reviewed with the patient. The patient verbalized understanding. Discharge medications reviewed with the patient and appropriate educational materials and side effects teaching were provided. Patient armband removed and given to patient to take home. Patient was informed of the privacy risks if armband lost or stolen. Introducing [facility] & HEALTH SERVICES! Asha Griffith introduces [employer] patient portal. Now you can access parts of your medical record, email your doctor's office, and request medication refills online. 1. In your internet browser, go to [email or web address] 2. Click on the First Time User? Click Here link in the Sign In box. You will see the New Member Sign Up page. 3. Enter your [employer] Access Code exactly as it appears below. You will not need to use this code after youve completed the sign-up process. If you do not sign up before the expiration date, you must request a new code. · [employer] Access Code: [email or web address] Expires: 6/14/2018  2:34 PM 
 
4. Enter the last four digits of your Social Security Number (xxxx) and Date of Birth (mm/dd/yyyy) as indicated and click Submit. You will be taken to the next sign-up page. 5. Create a [employer] ID. This will be your [employer] login ID and cannot be changed, so think of one that is secure and easy to remember. 6. Create a Flaskon password. You can change your password at any time. 7. Enter your Password Reset Question and Answer. This can be used at a later time if you forget your password. 8. Enter your e-mail address. You will receive e-mail notification when new information is available in [address] 9. Click Sign Up. You can now view and download portions of your medical record. 10. Click the Download Summary menu link to download a portable copy of your medical information. If you have questions, please visit the Frequently Asked Questions section of the Flaskon website. Remember, Flaskon is NOT to be used for urgent needs. For medical emergencies, dial 911. Now available from your iPhone and Android! Introducing Ed Pan As a [employer] patient, I wanted to make you aware of our electronic visit tool called Ed Pan. [employer] 24/7 allows you to connect within minutes with a medical provider 24 hours a day, seven days a week via a mobile device or tablet or logging into a secure website from your computer. You can access Ed Pan from anywhere in the [place]. A virtual visit might be right for you when you have a simple condition and feel like you just dont want to get out of bed, or cant get away from work for an appointment, when your regular [employer] provider is not available (evenings, weekends or holidays), or when youre out of town and need minor care. Electronic visits cost only $49 and if the [employer] 24/7 provider determines a prescription is needed to treat your condition, one can be electronically transmitted to a nearby pharmacy*. Please take a moment to enroll today if you have not already done so. The enrollment process is free and takes just a few minutes. To enroll, please download the [employer] 24/7 tariq to your tablet or phone, or visit [email or web address] to enroll on your computer. And, as an [address] [place] [address] patient with a [employer] account, the results of your visits will be scanned into your electronic medical record and your primary care provider will be able to view the scanned results. We urge you to continue to see your regular [employer] provider for your ongoing medical care. And while your primary care provider may not be the one available when you seek a Ed Greergordonfin virtual visit, the peace of mind you get from getting a real diagnosis real time can be priceless. For more information on Ed Buchananfin, view our Frequently Asked Questions (FAQs) at [email or web address]. Sincerely, 
 
Violetta Robles MD 
Chief Medical Officer Melonie8 Hiral Gene *:  certain medications cannot be prescribed via Ed Percygordonfin Providers Seen During Your Hospitalization Provider Specialty Primary office phone Lesjah Sears, [address] [facility] Surgery [phone] Your Primary Care Physician (PCP) Primary Care Physician Office Phone Office Fax Brianabraham Lopezner, [address] [place] [address] [phone] You are allergic to the following Allergen Reactions Sulfa (Sulfonamide Antibiotics) Hives Recent Documentation Height Weight BMI OB Status Smoking Status 1.676 m 61 kg 21.72 kg/m2 Having regular periods Never Smoker Emergency Contacts Name Discharge Info Relation Home Work Mobile [address] CAREGIVER [3] Mother [14] [phone] Patient Belongings The following personal items are in your possession at time of discharge: 
  Dental Appliances: None  Visual Aid: Glasses      Home Medications: None   Jewelry: None  Clothing: Undergarments, Shirt, Pants    Other Valuables: None Please provide this summary of care documentation to your next provider.  
  
  
 
  
Signatures-by signing, you are acknowledging that this After Visit Summary has been reviewed with you and you have received a copy. Patient Signature:  ____________________________________________________________ Date:  ____________________________________________________________  
  
Mira Piety Provider Signature:  ____________________________________________________________ Date:  ____________________________________________________________

## 2018-05-10 NOTE — ANESTHESIA POSTPROCEDURE EVALUATION
Post-Anesthesia Evaluation and Assessment    Patient: Dani Elizabeth MRN: [id number]  SSN: [id number]    YOB: 1975  Age: 37 y.o. Sex: female      Data from PACU flowsheet    Cardiovascular Function/Vital Signs  Visit Vitals    /87    Pulse 97    Temp 37.3 °C (99.1 °F)    Resp 18    Ht 5' 6\" (1.676 m)    Wt 61 kg (134 lb 9 oz)    SpO2 99%    BMI 21.72 kg/m2       Patient is status post general anesthesia for Procedure(s):  excision right breast mass. Nausea/Vomiting: controlled    Postoperative hydration reviewed and adequate. Pain:  Pain Scale 1: Numeric (0 - 10) (05/10/18 1409)  Pain Intensity 1: 0 (05/10/18 1409)   Managed      Mental Status and Level of Consciousness: Alert and oriented     Pulmonary Status:   O2 Device: Room air (05/10/18 1409)   Adequate oxygenation and airway patent    Complications related to anesthesia: None    Post-anesthesia assessment completed.  No concerns    Signed By: Ha Jack MD     May 10, 2018

## 2018-05-22 ENCOUNTER — OFFICE VISIT (OUTPATIENT)
Dept: SURGERY | Age: 43
End: 2018-05-22

## 2018-05-22 VITALS
HEIGHT: 66 IN | RESPIRATION RATE: 20 BRPM | SYSTOLIC BLOOD PRESSURE: 145 MMHG | BODY MASS INDEX: 21.53 KG/M2 | WEIGHT: 134 LBS | TEMPERATURE: 99.7 F | DIASTOLIC BLOOD PRESSURE: 105 MMHG | HEART RATE: 120 BPM

## 2018-05-22 DIAGNOSIS — N63.10 BREAST MASS, RIGHT: Primary | ICD-10-CM

## 2018-05-22 NOTE — PROGRESS NOTES
SUBJECTIVE: Jean Marin is a 37 y.o.  female is seen for a routine postop check. Reports no problems with the wound or other issues. Activity, diet and bowels are normal. Minimal pain. OBJECTIVE: Appears well. Wound is healing well without complications or infection. ASSESSMENT: Normal postoperative course, doing well. 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 PLAN: Sutures removed. Patient to call if she has any problems. She should have her next mammogram in a year. Follow-up Disposition:  Return if symptoms worsen or fail to improve. Maureen Ureña MD    Please note: This document has been produced using voice recognition software. Unrecognizable air is in transcription may be present.

## 2018-05-22 NOTE — COMMUNICATION BODY
Dear Jewell Weinstein came back to the office today after the excision of the palpable nodule in the inferior medial right breast.  This turned out to show fibrosis and no evidence of any malignancy. She seems to be doing quite well at this time and should continue to have her next mammogram in about 1 year.     With kindest regards,    Blas Gallego MD

## 2018-05-22 NOTE — MR AVS SNAPSHOT
[address] [address] 
[phone] Patient: Donte Saldaña MRN: [id number] VTC:[birth year] Visit Information Date & Time Provider Department Dept. Phone Encounter #  
 5/22/2018  1:45 PM [facility] [phone] [id number] Follow-up Instructions Return if symptoms worsen or fail to improve. Follow-up and Disposition History Upcoming Health Maintenance Date Due DTaP/Tdap/Td series (1 - Tdap) 3/3/1994 PAP AKA CERVICAL CYTOLOGY 3/3/1994 Influenza Age 5 to Adult 8/1/2018 Allergies as of 5/22/2018  Review Complete On: 5/22/2018 By: Vimal Mata MD  
  
 Severity Noted Reaction Type Reactions Sulfa (Sulfonamide Antibiotics)  12/30/2014    Hives Current Immunizations  Never Reviewed No immunizations on file. Not reviewed this visit You Were Diagnosed With   
  
 Codes Comments Breast mass, right    -  Primary ICD-10-CM: N63.10 ICD-9-CM: 611.72 Vitals BP Pulse Temp Resp Height(growth percentile) Weight(growth percentile) (!) 145/105 (BP 1 Location: Left arm, BP Patient Position: At rest) (!) 120 99.7 °F (37.6 °C) (Oral) 20 5' 6\" (1.676 m) 134 lb (60.8 kg) LMP BMI OB Status Smoking Status 05/10/2018 21.63 kg/m2 Having regular periods Never Smoker Vitals History BMI and BSA Data Body Mass Index Body Surface Area  
 21.63 kg/m 2 1.68 m 2 Your Updated Medication List  
  
   
This list is accurate as of 5/22/18  2:51 PM.  Always use your most recent med list.  
  
  
  
  
 Ethlyn Nipple (28) 0.1-20 mg-mcg Tab Generic drug:  levonorgestrel-ethinyl estradiol Take  by mouth. NORVASC PO Take 5 mg by mouth daily. Follow-up Instructions Return if symptoms worsen or fail to improve.   
  
To-Do List   
 09/24/2018 9:00 AM  
 [facility] ([phone]) OUTSIDE FILMS  - Any outside films related to the study being scheduled should be brought with you on the day of the exam.  If this cannot be done there may be a delay in the reading of the study. MEDICATIONS  - Patient must bring a complete list of all medications currently taking to include prescriptions, over-the-counter meds, herbals, vitamins & any dietary supplements Introducing [facility]! Nahid Arnold introduces [employer] patient portal. Now you can access parts of your medical record, email your doctor's office, and request medication refills online. 1. In your internet browser, go to [email or web address] 2. Click on the First Time User? Click Here link in the Sign In box. You will see the New Member Sign Up page. 3. Enter your [employer]Art Access Code exactly as it appears below. You will not need to use this code after youve completed the sign-up process. If you do not sign up before the expiration date, you must request a new code. · [employer] Access Code: [email or web address] Expires: 6/14/2018  2:34 PM 
 
4. Enter the last four digits of your Social Security Number (xxxx) and Date of Birth (mm/dd/yyyy) as indicated and click Submit. You will be taken to the next sign-up page. 5. Create a [employer]Art ID. This will be your [employer]Art login ID and cannot be changed, so think of one that is secure and easy to remember. 6. Create a [employer]Art password. You can change your password at any time. 7. Enter your Password Reset Question and Answer. This can be used at a later time if you forget your password. 8. Enter your e-mail address. You will receive e-mail notification when new information is available in [address] 9. Click Sign Up. You can now view and download portions of your medical record. 10. Click the Download Summary menu link to download a portable copy of your medical information. If you have questions, please visit the Frequently Asked Questions section of the P[employer]xt website. Remember, Booker is NOT to be used for urgent needs. For medical emergencies, dial 911. Now available from your iPhone and Android! Please provide this summary of care documentation to your next provider. Your primary care clinician is listed as Piyush Melgar. If you have any questions after today's visit, please call [phone].

## 2018-05-22 NOTE — PROGRESS NOTES
Charles Lopes is a 37 y.o. female who presents today with   Chief Complaint   Patient presents with    Surgical Follow-up     s/p Right breast mass excsion 5/10/2018                1. Have you been to the ER, urgent care clinic since your last visit? Hospitalized since your last visit? No    2. Have you seen or consulted any other health care providers outside of the [address] [place] [address] since your last visit? Include any pap smears or colon screening.  No

## 2018-10-19 ENCOUNTER — HOSPITAL ENCOUNTER (OUTPATIENT)
Dept: ULTRASOUND IMAGING | Age: 43
Discharge: HOME | End: 2018-10-19
Attending: INTERNAL MEDICINE
Payer: COMMERCIAL

## 2018-10-19 DIAGNOSIS — N63.0 LUMP OR MASS IN BREAST: ICD-10-CM

## 2018-10-19 PROCEDURE — 76642 ULTRASOUND BREAST LIMITED: CPT

## 2022-03-18 PROBLEM — N63.10 BREAST MASS, RIGHT: Status: ACTIVE | Noted: 2018-04-17

## 2022-04-05 ENCOUNTER — TRANSCRIBE ORDER (OUTPATIENT)
Dept: SCHEDULING | Age: 47
End: 2022-04-05

## 2022-04-05 DIAGNOSIS — Z12.31 ENCOUNTER FOR SCREENING MAMMOGRAM FOR MALIGNANT NEOPLASM OF BREAST: Primary | ICD-10-CM

## 2023-01-30 DIAGNOSIS — Z12.31 ENCOUNTER FOR SCREENING MAMMOGRAM FOR MALIGNANT NEOPLASM OF BREAST: Primary | ICD-10-CM

## 2023-02-04 DIAGNOSIS — Z12.31 ENCOUNTER FOR SCREENING MAMMOGRAM FOR MALIGNANT NEOPLASM OF BREAST: Primary | ICD-10-CM

## 2024-11-06 ENCOUNTER — TRANSCRIBE ORDERS (OUTPATIENT)
Dept: SCHEDULING | Age: 49
End: 2024-11-06

## 2024-11-06 DIAGNOSIS — Z12.31 VISIT FOR SCREENING MAMMOGRAM: Primary | ICD-10-CM

## 2024-11-15 ENCOUNTER — HOSPITAL ENCOUNTER (OUTPATIENT)
Dept: WOMENS IMAGING | Facility: HOSPITAL | Age: 49
Discharge: HOME | End: 2024-11-18
Payer: COMMERCIAL

## 2024-11-15 DIAGNOSIS — Z12.31 VISIT FOR SCREENING MAMMOGRAM: ICD-10-CM

## 2024-11-15 PROCEDURE — 77063 BREAST TOMOSYNTHESIS BI: CPT

## (undated) DEVICE — SUTURE VCRL SZ 2-0 L27IN ABSRB UD L26MM SH 1/2 CIR J417H

## (undated) DEVICE — INSULATED BLADE ELECTRODE: Brand: EDGE

## (undated) DEVICE — ZINACTIVE USE 2641837 CLIP LIG M BLU TI HRT SHP WIRE HORZ 600 PER BX

## (undated) DEVICE — KENDALL SCD EXPRESS SLEEVES, KNEE LENGTH, MEDIUM: Brand: KENDALL SCD

## (undated) DEVICE — SUTURE VCRL SZ 3-0 L18IN ABSRB UD POLYGLACTIN 910 BRAID TIE J910T

## (undated) DEVICE — NEEDLE HYPO 25GA L1.5IN BLU POLYPR HUB S STL REG BVL STR

## (undated) DEVICE — STERILE POLYISOPRENE POWDER-FREE SURGICAL GLOVES: Brand: PROTEXIS

## (undated) DEVICE — Z DISCONTINUED USE 2219801 STAPLER SKIN REG CRWN L5.7MM LEG L3.9MM WIRE DIA0.53MM PROX

## (undated) DEVICE — SUTURE PROL SZ 4-0 L18IN NONABSORBABLE BLU L19MM PC-5 3/8 8631G

## (undated) DEVICE — SPONGE: SPECIALTY CHERRY DISS XR 100/CS: Brand: MEDICAL ACTION INDUSTRIES

## (undated) DEVICE — PAD,NON-ADHERENT,3X8,STERILE,LF,1/PK: Brand: MEDLINE

## (undated) DEVICE — BANDAGE COMPR W4INXL5YD BGE COHESIVE SELF ADH ADBAN CBN1104] AVCOR HEALTHCARE PRODUCTS INC]

## (undated) DEVICE — SUT ETHLN 3-0 18IN PS1 BLK --

## (undated) DEVICE — SYR 10ML CTRL LR LCK NSAF LF --

## (undated) DEVICE — TABLE COVER: Brand: CONVERTORS

## (undated) DEVICE — SUTURE VCRL SZ 3-0 L27IN ABSRB UD L26MM SH 1/2 CIR J416H

## (undated) DEVICE — (D)STRIP SKN CLSR 0.5X4IN WHT --

## (undated) DEVICE — PAD,ABDOMINAL,5"X9",STERILE,LF,1/PK: Brand: MEDLINE INDUSTRIES, INC.

## (undated) DEVICE — CLIP INT SM WIDE RED TI TRNSVRS GRV CHEVRON SHP W PRECIS

## (undated) DEVICE — OCCLUSIVE GAUZE STRIP,3% BISMUTH TRIBROMOPHENATE IN PETROLATUM BLEND: Brand: XEROFORM

## (undated) DEVICE — DEPAUL MAJOR PROCEDURE PACK: Brand: MEDLINE INDUSTRIES, INC.

## (undated) DEVICE — CONVERTORS STOCKINETTE: Brand: CONVERTORS

## (undated) DEVICE — SOLUTION IV 1000ML 0.9% SOD CHL

## (undated) DEVICE — SUTURE VCRL SZ 2-0 L12X18IN ABSRB UD POLYGLACTIN 910 BRAID J911T

## (undated) DEVICE — (D)PREP SKN CHLRAPRP APPL 26ML -- CONVERT TO ITEM 371833